# Patient Record
(demographics unavailable — no encounter records)

---

## 2024-11-18 NOTE — DATA REVIEWED
[de-identified] : Three view x-ray of the left hand performed today in the office reveal a mildly angulated fracture of the fifth metacarpal neck.

## 2024-11-18 NOTE — PHYSICAL EXAM
[FreeTextEntry1] : Examination today reveals good motion to the wrist is obvious swelling and tenderness to the fifth metacarpal distally where there is mild deformity apex dorsal.  She does however have full extension and just about full flexion.  Compartments are soft neurovascular status is intact.

## 2024-11-18 NOTE — ASSESSMENT
[FreeTextEntry1] : Impression: Fracture left fifth metacarpal.  This patient has been placed into a long dorsal splint immobilizing the fourth and fifth rays.  This she will leave in place full-time for 2 weeks following which she will continue with aashish taping the fourth and fifth fingers and will use a cock up Velcro wrist splint.  No gym/sports until her next visit in 3 weeks at which time she will have an x-ray of the left hand

## 2024-11-18 NOTE — HISTORY OF PRESENT ILLNESS
[FreeTextEntry1] : This 16-year-old is seen for evaluation of her left hand.  She was well until 5 days ago when she punched a wall sustaining injury this is caused swelling and stiffness.  She is more uncomfortable about the ulnar aspect of the hand.  Prior to this no complaints

## 2024-11-18 NOTE — DATA REVIEWED
[de-identified] : Three view x-ray of the left hand performed today in the office reveal a mildly angulated fracture of the fifth metacarpal neck.

## 2024-12-06 NOTE — ASSESSMENT
[FreeTextEntry1] : Impression: Fracture left fifth metacarpal.  She will continue with her splint for another 5 days she will be allowed to return to gym in 10 days return here as needed

## 2024-12-06 NOTE — HISTORY OF PRESENT ILLNESS
[FreeTextEntry1] : This 16-year-old returns for follow-up of her left hand.  She is much more comfortable on today's visit

## 2024-12-06 NOTE — PHYSICAL EXAM
[FreeTextEntry1] : Examination today she has excellent motion to the left fifth ray with no significant tenderness over the metacarpal fracture  strength is quite good.  X-rays ordered and taken today of the left hand reveal satisfactory alignment and ongoing healing of the distal fifth metacarpal fracture